# Patient Record
Sex: FEMALE | Race: WHITE
[De-identification: names, ages, dates, MRNs, and addresses within clinical notes are randomized per-mention and may not be internally consistent; named-entity substitution may affect disease eponyms.]

---

## 2017-07-31 ENCOUNTER — HOSPITAL ENCOUNTER (OUTPATIENT)
Dept: HOSPITAL 80 - FIMAGING | Age: 62
End: 2017-07-31
Attending: FAMILY MEDICINE
Payer: COMMERCIAL

## 2017-07-31 DIAGNOSIS — Z12.31: Primary | ICD-10-CM

## 2017-07-31 PROCEDURE — G0202 SCR MAMMO BI INCL CAD: HCPCS

## 2017-09-17 ENCOUNTER — HOSPITAL ENCOUNTER (OUTPATIENT)
Dept: HOSPITAL 80 - FIMAGING | Age: 62
End: 2017-09-17
Attending: ORTHOPAEDIC SURGERY
Payer: COMMERCIAL

## 2017-09-17 DIAGNOSIS — M76.52: ICD-10-CM

## 2017-09-17 DIAGNOSIS — M22.42: ICD-10-CM

## 2017-09-17 DIAGNOSIS — M17.9: Primary | ICD-10-CM

## 2017-10-19 ENCOUNTER — HOSPITAL ENCOUNTER (OUTPATIENT)
Dept: HOSPITAL 80 - FSGY | Age: 62
Discharge: HOME | End: 2017-10-19
Attending: ORTHOPAEDIC SURGERY
Payer: COMMERCIAL

## 2017-10-19 VITALS
DIASTOLIC BLOOD PRESSURE: 79 MMHG | SYSTOLIC BLOOD PRESSURE: 129 MMHG | RESPIRATION RATE: 14 BRPM | OXYGEN SATURATION: 98 % | HEART RATE: 60 BPM

## 2017-10-19 VITALS — TEMPERATURE: 96.8 F

## 2017-10-19 DIAGNOSIS — M76.52: ICD-10-CM

## 2017-10-19 DIAGNOSIS — M22.42: Primary | ICD-10-CM

## 2017-10-19 DIAGNOSIS — M22.3X2: ICD-10-CM

## 2017-10-19 DIAGNOSIS — M23.8X2: ICD-10-CM

## 2017-10-19 RX ADMIN — Medication PRN MCG: at 10:02

## 2017-10-19 RX ADMIN — Medication PRN MCG: at 10:17

## 2017-10-19 NOTE — POSTOPPROG
Post Op Note


Date of Operation: 10/19/17


Surgeon: Nikki Love


Assistant: Theresa Hutson


Anesthesiologist: Dr. Ch


Anesthesia: GET(General Endotracheal)


Pre-op Diagnosis: left knee pain


Post-op Diagnosis: left knee pain


Indication: left knee pain


Procedure: left knee arthroscopy, debridement, patellar tendon debridement, PRP 

inject


Inf/Abcess present in the surg proc area at time of surgery?: No


EBL: Minimal


Complications: 





none

## 2017-10-19 NOTE — GOP
[f rep st]



                                                                OPERATIVE REPORT





DATE OF OPERATION:  10/19/2017



SURGEON:  Nikki Love MD



ASSISTANT:  Marisol Hutson, ABHINAV.



ANESTHESIA:  General.



PREOPERATIVE DIAGNOSIS:  Chronic patellar tendinitis with inflammation of Hoffa fat pad and patellofe
moral syndrome, left knee.



POSTOPERATIVE DIAGNOSIS:  Chronic patellar tendinitis with inflammation of Hoffa fat pad and patellof
emoral syndrome, left knee.



PROCEDURE PERFORMED:  Arthroscopy and extensive synovectomy with chondroplasty of patella and debride
ment of Hoffa fat pad, arthrotomy with debridement of patellar tendon and PRP injection, left knee.



FINDINGS:  





ESTIMATED BLOOD LOSS:  Minimal.



DESCRIPTION OF PROCEDURE:  A diagnostic arthroscopy of the left knee was performed with the following
 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  The there wa
s a small area of full-thickness cartilage loss.  There was significant fibrillation of the articular
 cartilage on the inferior portion of the patella.  The mid and superior portion of the patella were 
normal in appearance.  There are also some cracks in the trochlear groove but there was good cartilag
e covering of the trochlea.  The medial compartment was entered and the medial meniscus was normal.  
The articular cartilage in the medial compartment was normal.  The patient did have an old stretch in
jury of the ACL with some attenuation of the fibers.  There was a fair amount of the tendon which was
 intact.  The lateral compartment was visualized and the patient had normal lateral meniscus.  There 
was grade 2 chondromalacia of the lateral tibial plateau.  Articular cartilage on the lateral femoral
 condyle was normal.  There was significant inflammation in the Hoffa fat pad and extensive debrideme
nt and synovectomy of the anterior aspect of the knee was performed. 



Chondroplasty of the inferior pole of the patella was also performed using the shaver and the Vapr wa
nd.  Once the fat pad had been adequately debrided, the instrumentation was removed from the knee and
 a midline incision was made extending from the inferior pole of the patella distally for 4 cm.  Inci
anish was carried down through the subcutaneous tissue to the patellar tendon.  The peritenon was spli
t in the midline and retracted and then the patellar tendon was split in the midline.  Area of mucino
us degeneration was found on the lateral half of the patellar tendon.  A strip measuring approximatel
y 4-5 mm in width was removed and the underlying fat pad was further debrided.  Once the tendon was a
dequately debrided, the defect in the tendon was closed using 0 Vicryl followed by 2-0 Vicryl in the 
peritenon.  Platelet gel was injected into the defect in the patellar tendon.  The subcutaneous tissu
e was then closed using 2-0 Vicryl followed by 4-0 Vicryl in the skin.  The portals were closed using
 4-0 nylon.  



The patient tolerated the procedure well.  There were no complications.  Estimated blood loss minimal
.  Final sponge, needle counts were correct.  The patient was transferred to the recovery room in goo
d condition.



SURGEON:  Nikki Love MD.





Job #:  [id number]

## 2017-10-19 NOTE — SOAPPROG
SOAP Progress Note


Assessment/Plan: 


Assessment/Plan:


62y/o female s/p left knee arthroscopy, debridement, PRP injection


- stable and doing well


- orders as written


- home when PACU criteria met


- call with issues or concerns





10/19/17 09:50





Subjective: 





Minimal pain, doing well


Objective: 





 Vital Signs











Temp Pulse Resp BP Pulse Ox


 


 36.4 C   67   16   116/80   97 


 


 10/19/17 06:30  10/19/17 06:30  10/19/17 06:30  10/19/17 06:30  10/19/17 06:30








NAD, waking from anesthesia


VSS, EOMi, face symmetric


MAEx4


incisions CDI





ICD10 Worksheet


Patient Problems: 


 Problems











Problem Status Onset


 


Knee pain Acute  














- ICD10 Problem Qualifiers


(1) Knee pain

## 2017-10-19 NOTE — PDANEPAE
ANE History of Present Illness


60 yo female for knee arthroscopy.





ANE Past Medical History





- Cardiovascular History


Hx Hypertension: No


Hx Arrhythmias: No


Hx Chest Pain: No


Hx Coronary Artery / Peripheral Vascular Disease: No


Hx CHF / Valvular Disease: No


Hx Palpitations: No





- Pulmonary History


Hx COPD: No


Hx Asthma/Reactive Airway Disease: No


Hx Recent Upper Respiratory Infection: No


Hx Oxygen in Use at Home: No


Hx Sleep Apnea: No


Sleep Apnea Screening Result - Last Documented: Negative





- Neurologic History


Hx Cerebrovascular Accident: No


Hx Seizures: No


Hx Dementia: No





- Endocrine History


Hx Diabetes: No


Hypothyroid: No


Obesity: no





- Renal History


Hx Renal Disorders: No





- Liver History


Hx Hepatic Disorders: No





- Neurological & Psychiatric Hx


Hx Neurological and Psychiatric Disorders: No





- Cancer History


Hx Cancer: No





- Congenital Disorder History


Hx Congenital Disorders: No





- GI History


Hx Gastrointestinal Disorders: No





- Other Health History


Other Health History: NONE





- Chronic Pain History


Chronic Pain: No





- Surgical History


Prior Surgeries: NONE





ANE Review of Systems


Review of systems is: negative


Review of Systems: 








- Exercise capacity


METS (RN): 4 METS





- Systems


Constitutional: Reports: no symptoms


Cardiac: Reports: no symptoms


Respiratory: Reports: no symptoms





ANE Patient History





- Allergies


Allergies/Adverse Reactions: 








No Known Allergies Allergy (Verified 10/13/17 11:46)


 








- Home Medications


Home medications: home medication list seen and reviewed


Home Medications: 








Estrogel  10/19/17 [Last Taken 10/12/17]


Fish Oil 1,000 mg Capsule DAILY 10/19/17 [Last Taken 10/12/17]


Progesterone,Micronized [Prometrium] 100 mg PO DAILY 10/19/17 [Last Taken 10/12/

17]








- NPO status


NPO Status: no food or drink >8 hours





- Anes Hx


Anes Hx: no prior problems





- Smoking Hx


Smoking Status: Never smoked


Marijuana use: No





- Alcohol Use


Alcohol Use: Rarely (4/week)





- Family Anes Hx


Family Anes Hx: neg - N/A


Family Hx Anesthesia Complications: NONE





ANE Labs/Vital Signs





- Vital Signs


Vital Signs: reviewed preoperatively; see RN documention for details


Height: 160.02 cm


Weight: 65.771 kg





ANE Physical Exam





- Airway


Neck exam: FROM


Mallampati Score: Class 2


Mouth exam: normal dental/mouth exam





- Pulmonary


Pulmonary: clear to auscultation





- Cardiovascular


Cardiovascular: regular rate and rhythym





- ASA Status


ASA Status: II





ANE Anesthesia Plan


Anesthesia Plan: GA w LMA


Urgent/Emergent Case: Anes eval completed preop but documented later for safe 

timely pt care (This was not an urgent/emergent case, but [employer] issues 

prevented documentation prior to case start. IT had to get involved to enable 

documebntation to be completed.)

## 2018-05-29 ENCOUNTER — HOSPITAL ENCOUNTER (OUTPATIENT)
Dept: HOSPITAL 80 - BMCIMAGING | Age: 63
End: 2018-05-29
Attending: PODIATRIST
Payer: COMMERCIAL

## 2018-05-29 DIAGNOSIS — M79.671: Primary | ICD-10-CM

## 2018-07-06 ENCOUNTER — HOSPITAL ENCOUNTER (OUTPATIENT)
Dept: HOSPITAL 80 - FSGY | Age: 63
Discharge: HOME | End: 2018-07-06
Attending: PODIATRIST
Payer: COMMERCIAL

## 2018-07-06 VITALS — DIASTOLIC BLOOD PRESSURE: 79 MMHG | SYSTOLIC BLOOD PRESSURE: 126 MMHG

## 2018-07-06 DIAGNOSIS — M67.471: Primary | ICD-10-CM

## 2018-07-06 PROCEDURE — 0LBV0ZX EXCISION OF RIGHT FOOT TENDON, OPEN APPROACH, DIAGNOSTIC: ICD-10-PCS | Performed by: PODIATRIST

## 2018-07-06 NOTE — PDANEPAE
ANE History of Present Illness





removal of ganglion cyst





ANE Past Medical History





- Cardiovascular History


Hx Hypertension: No


Hx Arrhythmias: No


Hx Chest Pain: No


Hx Coronary Artery / Peripheral Vascular Disease: No


Hx CHF / Valvular Disease: No


Hx Palpitations: No





- Pulmonary History


Hx COPD: No


Hx Asthma/Reactive Airway Disease: No


Hx Recent Upper Respiratory Infection: No


Hx Oxygen in Use at Home: No


Hx Sleep Apnea: No


Sleep Apnea Screening Result - Last Documented: Negative





- Neurologic History


Hx Cerebrovascular Accident: No


Hx Seizures: No


Hx Dementia: No





- Endocrine History


Hx Diabetes: No





- Renal History


Hx Renal Disorders: No





- Liver History


Hx Hepatic Disorders: No





- Neurological & Psychiatric Hx


Hx Neurological and Psychiatric Disorders: No





- Cancer History


Hx Cancer: No





- Congenital Disorder History


Hx Congenital Disorders: No





- GI History


Hx Gastrointestinal Disorders: No





- Other Health History


Other Health History: NONE





- Chronic Pain History


Chronic Pain: No





- Surgical History


Prior Surgeries: L KNEE SCOPE X2 ARTHROSTOMY & ARTHROTOMY





ANE Review of Systems


Review of Systems: 








- Exercise capacity


Exercise capacity: >=4 METS


METS (RN): 5 METS





ANE Patient History





- Allergies


Allergies/Adverse Reactions: 








No Known Allergies Allergy (Verified 10/13/17 11:46)


 








- Home Medications


Home medications: home medication list seen and reviewed


Home Medications: 








Fish Oil 1,000 mg Capsule DAILY 10/19/17 [Last Taken 1 Week Ago ~06/29/18]


Escitalopram Oxalate  06/27/18 [Last Taken 07/05/18]








- NPO status


NPO Status: no food or drink >8 hours


NPO Since - Liquids (Date): 07/06/18


NPO Since - Liquids (Time): 01:00


NPO Since - Solids (Date): 07/05/18


NPO Since - Solids (Time): 20:30





- Anes Hx


Anes Hx: no prior problems





- Smoking Hx


Smoking Status: Never smoked





- Family Anes Hx


Family Hx Anesthesia Complications: NONE





ANE Labs/Vital Signs





- Vital Signs


Blood Pressure: 109/85


Heart Rate: 59


Respiratory Rate: 18


O2 Sat (%): 96


Height: 160.02 cm


Weight: 65.771 kg





ANE Physical Exam





- Airway


Mallampati Score: Class 2


Mouth exam: normal dental/mouth exam





- Pulmonary


Pulmonary: no respiratory distress





- Cardiovascular


Cardiovascular: regular rate and rhythym





- ASA Status


ASA Status: I





ANE Anesthesia Plan


Anesthesia Plan: GA with mask, MAC

## 2018-07-06 NOTE — GOP
[f rep st]



                                                                OPERATIVE REPORT





DATE OF OPERATION:  



SURGEON:  Anna Pan DPM



ANESTHESIA:  Local with monitored anesthesia care.



ANESTHESIOLOGIST:  Dr. Parmar



PREOPERATIVE DIAGNOSIS:  Right foot ganglion cyst.



POSTOPERATIVE DIAGNOSIS:  Right foot ganglion cyst.



PROCEDURE PERFORMED:  Right foot excision of ganglion cyst.



FINDINGS:  Gelatinous fluid expressed from the cyst upon excision.



SPECIMENS:  Right foot ganglion cyst sent to Pathology.



ESTIMATED BLOOD LOSS:  Minimal.



INDICATIONS:  Painful ganglion cyst, right foot.



DESCRIPTION OF PROCEDURE:  Under mild sedation, the patient was brought to the operating room, and pl
aced on the operating table in supine position.  Following IV sedation, local anesthesia was obtained
 about the right foot using 5 cc of 1% lidocaine plain and 15 cc of 0.25% Marcaine plain.  The foot w
as then scrubbed, prepped, and draped in the usual aseptic manner.  A sterile pneumatic ankle tourniq
uet was placed about the right ankle.  The foot was exsanguinated and tourniquet inflated to 225 mmHg
.  Attention was then directed over the ganglion cyst, where an incision was made from proximal to di
stal.  The incision was deepened through subcutaneous tissue with care taken to identify and retract 
all vital neural and vascular structures.  All bleeders were cauterized as necessary.  The cyst was i
mmediately identified and dissected free of the surrounding soft tissue structures.  It was found to 
dive deep into the capsule with a small defect in the capsule causing the cyst to form.  The cyst was
 removed and sent to Pathology.  The wound was irrigated with copious sterile saline and Ancef irriga
tion.  The capsule was then repaired with 3-0 Vicryl.  The subcuticular layer was performed with 4-0 
Monocryl, and the skin with 4-0 Prolene in a running subcuticular suture technique.  Mastisol, Steri-
Strips, Xeroform, 4x4 gauze, Patrice, and Ace wrap were applied.  The tourniquet was deflated at 15 min
utes.  A prompt hyperemic response was noted to all digits of the right foot. 



The patient was then transferred to the recovery room with vital signs stable and vascular status int
act.  Following a period of postoperative monitoring, she will be discharged home.  Advised to ice an
d elevate her foot.  She was advised to keep the dressing clean, dry, and intact.  She is weightbeari
ng as tolerated in the walking boot for the next few weeks.  She will follow up with me in the next w
[place] for wound check and dressing change.



HEMOSTASIS:  Pneumatic ankle tourniquet at 225 mmHg for 15 minutes.





Job #:  [id number]

## 2018-07-06 NOTE — POSTOPPROG
Post Op Note


Date of Operation: 07/06/18


Surgeon: Anna Pan


Anesthesiologist: Dr. Parmar


Anesthesia: IV Sedation


Pre-op Diagnosis: Right foot ganglion cyst


Post-op Diagnosis: Right foot ganglion cyst


Indication: Painful ganglion cyst right foot


Procedure: Right foot excision of ganglion cyst


Findings: Gelatinous fluid expressed from the ganglion cyst


Inf/Abcess present in the surg proc area at time of surgery?: No


Depth: Superfical  (Skin SQ)


EBL: PAT @225 mmHg 15 minutes


Complications: 





None

## 2018-09-20 ENCOUNTER — HOSPITAL ENCOUNTER (OUTPATIENT)
Dept: HOSPITAL 80 - FIMAGING | Age: 63
End: 2018-09-20
Attending: FAMILY MEDICINE
Payer: COMMERCIAL

## 2018-09-20 DIAGNOSIS — Z12.31: Primary | ICD-10-CM
